# Patient Record
Sex: FEMALE
[De-identification: names, ages, dates, MRNs, and addresses within clinical notes are randomized per-mention and may not be internally consistent; named-entity substitution may affect disease eponyms.]

---

## 2023-04-17 PROBLEM — Z00.00 ENCOUNTER FOR PREVENTIVE HEALTH EXAMINATION: Status: ACTIVE | Noted: 2023-04-17

## 2023-05-17 ENCOUNTER — APPOINTMENT (OUTPATIENT)
Dept: ENDOCRINOLOGY | Facility: CLINIC | Age: 63
End: 2023-05-17

## 2023-05-18 ENCOUNTER — EMERGENCY (EMERGENCY)
Facility: HOSPITAL | Age: 63
LOS: 1 days | Discharge: HOME | End: 2023-05-18
Attending: STUDENT IN AN ORGANIZED HEALTH CARE EDUCATION/TRAINING PROGRAM
Payer: COMMERCIAL

## 2023-05-18 VITALS
OXYGEN SATURATION: 98 % | DIASTOLIC BLOOD PRESSURE: 74 MMHG | HEART RATE: 82 BPM | RESPIRATION RATE: 18 BRPM | SYSTOLIC BLOOD PRESSURE: 125 MMHG

## 2023-05-18 VITALS
DIASTOLIC BLOOD PRESSURE: 73 MMHG | RESPIRATION RATE: 19 BRPM | SYSTOLIC BLOOD PRESSURE: 135 MMHG | WEIGHT: 115.08 LBS | OXYGEN SATURATION: 99 % | TEMPERATURE: 98 F | HEART RATE: 87 BPM | HEIGHT: 63 IN

## 2023-05-18 LAB
ALBUMIN SERPL ELPH-MCNC: 3.6 G/DL — SIGNIFICANT CHANGE UP (ref 3.5–5)
ALP SERPL-CCNC: 36 U/L — LOW (ref 40–120)
ALT FLD-CCNC: 22 U/L DA — SIGNIFICANT CHANGE UP (ref 10–60)
ANION GAP SERPL CALC-SCNC: 9 MMOL/L — SIGNIFICANT CHANGE UP (ref 5–17)
APTT BLD: 25.7 SEC — LOW (ref 27.5–35.5)
AST SERPL-CCNC: 21 U/L — SIGNIFICANT CHANGE UP (ref 10–40)
BASOPHILS # BLD AUTO: 0.01 K/UL — SIGNIFICANT CHANGE UP (ref 0–0.2)
BASOPHILS NFR BLD AUTO: 0.1 % — SIGNIFICANT CHANGE UP (ref 0–2)
BILIRUB SERPL-MCNC: 0.9 MG/DL — SIGNIFICANT CHANGE UP (ref 0.2–1.2)
BUN SERPL-MCNC: 19 MG/DL — HIGH (ref 7–18)
CALCIUM SERPL-MCNC: 9.1 MG/DL — SIGNIFICANT CHANGE UP (ref 8.4–10.5)
CHLORIDE SERPL-SCNC: 105 MMOL/L — SIGNIFICANT CHANGE UP (ref 96–108)
CO2 SERPL-SCNC: 25 MMOL/L — SIGNIFICANT CHANGE UP (ref 22–31)
CREAT SERPL-MCNC: 1 MG/DL — SIGNIFICANT CHANGE UP (ref 0.5–1.3)
EGFR: 64 ML/MIN/1.73M2 — SIGNIFICANT CHANGE UP
EOSINOPHIL # BLD AUTO: 0.05 K/UL — SIGNIFICANT CHANGE UP (ref 0–0.5)
EOSINOPHIL NFR BLD AUTO: 0.4 % — SIGNIFICANT CHANGE UP (ref 0–6)
FLUAV AG NPH QL: SIGNIFICANT CHANGE UP
FLUBV AG NPH QL: SIGNIFICANT CHANGE UP
GLUCOSE SERPL-MCNC: 154 MG/DL — HIGH (ref 70–99)
HCT VFR BLD CALC: 44.8 % — SIGNIFICANT CHANGE UP (ref 34.5–45)
HGB BLD-MCNC: 15.2 G/DL — SIGNIFICANT CHANGE UP (ref 11.5–15.5)
IMM GRANULOCYTES NFR BLD AUTO: 0.4 % — SIGNIFICANT CHANGE UP (ref 0–0.9)
INR BLD: 1.08 RATIO — SIGNIFICANT CHANGE UP (ref 0.88–1.16)
LIDOCAIN IGE QN: 255 U/L — SIGNIFICANT CHANGE UP (ref 73–393)
LYMPHOCYTES # BLD AUTO: 0.58 K/UL — LOW (ref 1–3.3)
LYMPHOCYTES # BLD AUTO: 5.2 % — LOW (ref 13–44)
MCHC RBC-ENTMCNC: 29.3 PG — SIGNIFICANT CHANGE UP (ref 27–34)
MCHC RBC-ENTMCNC: 33.9 GM/DL — SIGNIFICANT CHANGE UP (ref 32–36)
MCV RBC AUTO: 86.5 FL — SIGNIFICANT CHANGE UP (ref 80–100)
MONOCYTES # BLD AUTO: 0.57 K/UL — SIGNIFICANT CHANGE UP (ref 0–0.9)
MONOCYTES NFR BLD AUTO: 5.1 % — SIGNIFICANT CHANGE UP (ref 2–14)
NEUTROPHILS # BLD AUTO: 9.94 K/UL — HIGH (ref 1.8–7.4)
NEUTROPHILS NFR BLD AUTO: 88.8 % — HIGH (ref 43–77)
NRBC # BLD: 0 /100 WBCS — SIGNIFICANT CHANGE UP (ref 0–0)
PLATELET # BLD AUTO: 168 K/UL — SIGNIFICANT CHANGE UP (ref 150–400)
POTASSIUM SERPL-MCNC: 3.6 MMOL/L — SIGNIFICANT CHANGE UP (ref 3.5–5.3)
POTASSIUM SERPL-SCNC: 3.6 MMOL/L — SIGNIFICANT CHANGE UP (ref 3.5–5.3)
PROT SERPL-MCNC: 6.7 G/DL — SIGNIFICANT CHANGE UP (ref 6–8.3)
PROTHROM AB SERPL-ACNC: 12.9 SEC — SIGNIFICANT CHANGE UP (ref 10.5–13.4)
RBC # BLD: 5.18 M/UL — SIGNIFICANT CHANGE UP (ref 3.8–5.2)
RBC # FLD: 12.1 % — SIGNIFICANT CHANGE UP (ref 10.3–14.5)
SARS-COV-2 RNA SPEC QL NAA+PROBE: SIGNIFICANT CHANGE UP
SODIUM SERPL-SCNC: 139 MMOL/L — SIGNIFICANT CHANGE UP (ref 135–145)
TROPONIN I, HIGH SENSITIVITY RESULT: 9.5 NG/L — SIGNIFICANT CHANGE UP
WBC # BLD: 11.2 K/UL — HIGH (ref 3.8–10.5)
WBC # FLD AUTO: 11.2 K/UL — HIGH (ref 3.8–10.5)

## 2023-05-18 PROCEDURE — 93005 ELECTROCARDIOGRAM TRACING: CPT

## 2023-05-18 PROCEDURE — 96374 THER/PROPH/DIAG INJ IV PUSH: CPT

## 2023-05-18 PROCEDURE — 85730 THROMBOPLASTIN TIME PARTIAL: CPT

## 2023-05-18 PROCEDURE — 84484 ASSAY OF TROPONIN QUANT: CPT

## 2023-05-18 PROCEDURE — 87637 SARSCOV2&INF A&B&RSV AMP PRB: CPT

## 2023-05-18 PROCEDURE — 83690 ASSAY OF LIPASE: CPT

## 2023-05-18 PROCEDURE — 96361 HYDRATE IV INFUSION ADD-ON: CPT

## 2023-05-18 PROCEDURE — 82962 GLUCOSE BLOOD TEST: CPT

## 2023-05-18 PROCEDURE — 99284 EMERGENCY DEPT VISIT MOD MDM: CPT | Mod: 25

## 2023-05-18 PROCEDURE — 99285 EMERGENCY DEPT VISIT HI MDM: CPT

## 2023-05-18 PROCEDURE — 93010 ELECTROCARDIOGRAM REPORT: CPT

## 2023-05-18 PROCEDURE — 36415 COLL VENOUS BLD VENIPUNCTURE: CPT

## 2023-05-18 PROCEDURE — 85025 COMPLETE CBC W/AUTO DIFF WBC: CPT

## 2023-05-18 PROCEDURE — 85610 PROTHROMBIN TIME: CPT

## 2023-05-18 PROCEDURE — 80053 COMPREHEN METABOLIC PANEL: CPT

## 2023-05-18 RX ORDER — SODIUM CHLORIDE 9 MG/ML
3 INJECTION INTRAMUSCULAR; INTRAVENOUS; SUBCUTANEOUS EVERY 8 HOURS
Refills: 0 | Status: DISCONTINUED | OUTPATIENT
Start: 2023-05-18 | End: 2023-05-21

## 2023-05-18 RX ORDER — SODIUM CHLORIDE 9 MG/ML
1000 INJECTION INTRAMUSCULAR; INTRAVENOUS; SUBCUTANEOUS ONCE
Refills: 0 | Status: COMPLETED | OUTPATIENT
Start: 2023-05-18 | End: 2023-05-18

## 2023-05-18 RX ORDER — ONDANSETRON 8 MG/1
4 TABLET, FILM COATED ORAL ONCE
Refills: 0 | Status: COMPLETED | OUTPATIENT
Start: 2023-05-18 | End: 2023-05-18

## 2023-05-18 RX ADMIN — SODIUM CHLORIDE 1000 MILLILITER(S): 9 INJECTION INTRAMUSCULAR; INTRAVENOUS; SUBCUTANEOUS at 04:21

## 2023-05-18 RX ADMIN — SODIUM CHLORIDE 1000 MILLILITER(S): 9 INJECTION INTRAMUSCULAR; INTRAVENOUS; SUBCUTANEOUS at 05:10

## 2023-05-18 RX ADMIN — ONDANSETRON 4 MILLIGRAM(S): 8 TABLET, FILM COATED ORAL at 04:21

## 2023-05-18 NOTE — ED PROVIDER NOTE - NSFOLLOWUPINSTRUCTIONS_ED_ALL_ED_FT
You were seen in the emergency room today. Please call your primary doctor to inform them of this ER visit and obtain the next available appointment within the next 5 days. As we discussed, return to the ER if you have any worsening symptoms.    We no longer feel that you need further emergency care or admission to the hospital at this time.    While we have determined that you are currently stable for discharge, we know that things can change. Please seek immediate medical attention or return to the ER if you experience any of the following:  Any worsening or persistent symptoms  Severe Pain  Chest Pain  Difficulty Breathing  Bleeding  Passing Out  Severe Rash  Inability to Eat or Drink  Persistent Fever    Please see a primary care doctor or specialist within 5 days to ensure that you are improving.    Please call the [facility] phone numbers on this document if you have any problems obtaining a follow up appointment.    I wish you well! -Dr Hood

## 2023-05-18 NOTE — ED PROVIDER NOTE - OBJECTIVE STATEMENT
62-year-old female with no significant past medical history, Xanax for sleep is her home only home medication, presenting with several hours of vomiting and diarrhea with syncope.  Patient states that yesterday morning she woke up feeling well and then over the day started feeling some fatigue and generalized weakness.  Patient states that she then went to bed and woke up approximately 4 hours ago with nausea vomiting and watery diarrhea. Pt denies any ABD pain with these symptoms. Patient states while sitting on the toilet she briefly passed out with no obvious pain/injuries from the fall. Pt denies associated chest pain, SOB, severe headache or neck stiffness, diaphoresis, focal numbness or weakness, fever, urinary symptoms. No h/o heart problems/ clots or clot RFs/ seizures. No other recent illness or hospitalizations.

## 2023-05-18 NOTE — ED PROVIDER NOTE - CLINICAL SUMMARY MEDICAL DECISION MAKING FREE TEXT BOX
Pt p/w several hours of vomiting and diarrhea w/o ABD pain and a syncopal episode and a reassuring exam. EKG wnl. Labs pending. Pt stable. Will reassess. Pt p/w several hours of vomiting and diarrhea w/o ABD pain and a syncopal episode and a reassuring exam. EKG wnl. Labs pending. Pt stable. Will reassess.    Labs showing mild WBC elevation and otw unremarkable. On reassessment pt is now feeling better and tolerating PO w/o issue. Rec PMD f/u ASAP. Most likely mild gastroenteritis vs other non emergent etiology of symptoms- the details of the case, history, and exam make more emergent diagnoses much less likely. Discussed with pt my clinical impression and results, patient given strict return precautions if persistent or worsening of symptoms occurs, and need for close follow up. Pt expressed understanding and agrees with plan. Pt is well appearing with a reassuring exam. Discharge home with PMD or Specialist f/u within 5 days.

## 2023-05-18 NOTE — ED PROVIDER NOTE - PATIENT PORTAL LINK FT
You can access the FollowMyHealth Patient Portal offered by [facility] by registering at the following website: [email or web address]. By joining [employer]’s FollowMyHealth portal, you will also be able to view your health information using other applications (apps) compatible with our system.

## 2023-05-18 NOTE — ED PROVIDER NOTE - PHYSICAL EXAMINATION
Vital Signs Reviewed  GEN: Comfortable, Conversant in full sentences with clear speech, NAD, AAOx3  HEENT: NCAT, MMM, Neck Supple  RESP: CTAB, No rales/rhonchi/wheezing  CV: RRR, S1S2, No murmurs  ABD: No TTP, Soft, ND, No masses, No CVA Tenderness  Extrem/Skin: Equal pulses bilat, No cyanosis/edema/rashes  Neuro: No focal deficits

## 2023-05-18 NOTE — ED ADULT NURSE NOTE - NSFALLUNIVINTERV_ED_ALL_ED
Bed/Stretcher in lowest position, wheels locked, appropriate side rails in place/Call bell, personal items and telephone in reach/Instruct patient to call for assistance before getting out of bed/chair/stretcher/Non-slip footwear applied when patient is off stretcher/[place] to call system/Physically safe environment - no spills, clutter or unnecessary equipment/Purposeful proactive rounding/Room/bathroom lighting operational, light cord in reach

## 2023-05-18 NOTE — ED ADULT TRIAGE NOTE - AS TEMP SITE
Chronic Pain - Established Visit    Referring Physician: No ref. provider found    Chief Complaint:   Chief Complaint   Patient presents with    Low-back Pain    Leg Pain        SUBJECTIVE: Disclaimer: This note has been generated using voice-recognition software. There may be typographical errors that have been missed during proof-reading    Interval History 6/7/2022:  The patient returns to clinic today for follow up of low back pain. He reports increased low back pain over the last two weeks. He reports low back pain that radiates into the posterior aspect of his right leg to the bottom of his foot. He denies any left leg pain. His pain is worse with prolonged standing and walking. He reports that previous TF JEAN MARIE in 2021 provided 80% relief. He did notice that it took 3-4 weeks to see full benefit. He continues to take Gabapentin. He continues to perform a home exercise routine as prescribed. He denies any weakness. He denies any other health changes. His pain today is 5/10.    Interval history 07/02/2021:  The patient presents for follow-up lower back pain and right leg radiculopathy.  He is status post repeat right-sided TF JEAN MARIE to L5/S1&S1.  He has difficulty quantifying pain relief but states that normally his leg pain is 100% resolved and this time pain persists.  He is not have any recent images.  His medication regimen includes gabapentin 300 mg states this is mildly beneficial or denies any adverse side effects of medication.  Denies any focal loss of bowel, bladder or saddle paresthesias concerning for cauda equina.    Interval history 05/27/2021:  The patient presents for follow-up of lower back pain and bilateral leg radiculopathy.  He has had prior ILESI and TFESI both with 80%  benefit lasting approximately 9 months and pain just now returning. Pain is worse to right side at this time.  He denies any new areas of pain or neurological changes.The patient denies myelopathic symptoms such as  handwriting changes or difficulty with buttons/coins/keys. Denies perineal paresthesias, bowel/bladder dysfunction.    Interval History 9/1/2020:  The patient is here for follow up of back and leg pain.  He is now s/p L5/S1 IL JEAN MARIE with 80% of back pain.  However, he is having significant right leg pain, mainly down the buttock and posterior calf.  He had benefit with right L5/S1 and S1 TF JEAN MARIE in the past and would like to repeat.  He has associated numbness to right calf, worse with walking.  He does have some benefit with Gabapentin.  He had a recent Covid test for work which was negative. His pain today is 5/10.    Interval History 6/9/2020:  The patient is here for follow up of chronic lower back pain.  We have not seen the patient since last year.  He underwent an epidural at that time and says that his pain has been very mild until a few weeks ago.  He is having pain across lower back with radiation into the legs, mainly on the left.  He continues to take gabapentin with some benefit.  The pain bothers him the most with walking and activity.  He had pacemaker replacement on 5/6/20.  He is not currently on blood thinners.  His pain today is 5/10.    Interval History 7/30/2019:  The patient returns for follow up of back pain.  He is s/p repeat left then right L3,4,5 RFAs completed on 6/24/19 with about 80% relief.  His back pain is mild.  He has had increased leg pain recently, worse on the left side.  Previous leg pain was relieved with JEAN MARIE last year.  He would like to repeat this.  His leg pain bothers him mainly at night when trying to sleep.  He stopped Gabapentin when he was not having leg pain but recently restarted.  He is unable to take at night.  His pain today is 6/10.    Interval History 5/14/2019:  The patient returns for follow up.  He previously had benefit with JEAN MARIE for leg pain and RFAs for back pain.  He is having some back pain that has been worsening over the past couple of weeks.  He has  significant pain in the morning.  He has some improvement when he moves around.  He says that cold air aggravates his pain.  He stopped Gabapentin last year when his leg pain improved.  He is not having much leg pain now.  His pain today is 6/10.    Interval History 2/7/2019:  The patient presents for check up of chronic back pain.  He reports doing well since last visit.  He feels that last JEAN MARIE is still providing him benefit.  He has not had any leg pain.  He does still have back pain but states that it is tolerable.  His morning pain is much improved from RFAs last year.  He continues to work and is active.  His pain today is 5/10.    Interval History 12/6/2018:  The patient presents today for follow up.  He continues with pain to the lower back.  He is not having leg pain at this time.  He had some relief with RFAs with the past.  He stopped Gabapentin when his leg pain improved.  He takes Tylenol sparingly with some benefit.  His pain today is 5/10.  The patient denies any bowel or bladder incontinence or signs of saddle paresthesia.  The patient denies any major medical changes since last office visit.    Interval History 10/25/2018:  The patient returns for follow up of back and leg pain.  He is s/p L5/S1 IL JEAN MARIE on 10/11/18 with 80% pain relief for his legs.  He has had increased lower back pain over the past few days which he attributes to weather changes.  He describes it as aching.  He has not been stretching.  He does walk a lot for work.  He cannot take oral NSAIDs due to pacemaker placement.  His pain today is 8/10.     Interval History 9/11/2018:  The patient presents for procedure follow up appointment.  He is s/p left then right L3,4,5 RFA completed on 8/14/18 with 80% pain relief initially.  He has had a little more pain over the past week.  He is now having intermittent radiation into the back of both legs, left greater than right.  He previously had benefit with right sided TF JEAN MARIE.  He is still  taking gabapentin.  His pain today is 5/10.    Interval History 7/25/2018:  The patient returns today for follow up of back pain.  He is s/p bilateral L3,4,5 MBB on 7/5/18 with 100% relief for 2 days.  He previously had benefit with right TF JEAN MARIE for leg pain.  He has not had right leg pain since the epidural.  However, he is reporting lateral left leg pain that has progressed over the past couple of weeks.  He continues to be active and works.  His pain today is 5/10.    Interval History 6/20/2018:  The patient returns today for follow up of lower back and right leg pain.  He is s/p right L5 and S1 TF JEAN MARIE on 6/5/18 with 100% relief of right leg pain.  He has not had any leg pain since the procedure.  He continues to report pain across the lower back.  This is always worse first thing in the morning.  He states that this feels aching and throbbing in nature.  He did have recent lumbar CT scan.  He would like to discuss further procedures.  He continues to work and be active.  His pain today is 5/10.    Interval History 5/22/2018:  The patient returns for follow up of back pain.  He is having radiation down there back of the right leg to the posterior calf.  The back pain is most severe in the morning and he states that this feels like a stiffness.  This improves when he walks.  He has severe leg pain that is intermittent, mainly with activity.  This is his biggest complaint.  He did complete the medrol dose pack recently with limited improvement.  He had XRAYs which show severe loss of disc space at L5/S1.  He has not had a CT scan.  He is taking Gabapentin 300 mg at bedtime.  It is written BID but it makes him drowsy during the day.  He has some benefit with Aleve but has been told to avoid NSAIDs due to history of pacemaker placement.  His pain today is 5/10.     Initial encounter:    Renny TOÑO Young presents to the clinic for the evaluation of lower back and right leg pain. The pain started two months ago and  symptoms have been worsening.    Brief history:    Pain Description:    The pain is located in the lower back and right leg area in the L5/S1 distribution.      At BEST  5/10     At WORST  7/10 on the WORST day.      On average pain is rated as 5/10.     Today the pain is rated as 5/10    The pain is described as aching      Symptoms interfere with daily activity.     Exacerbating factors: Standing and Morning.      Mitigating factors medications.     Patient denies night fever/night sweats, urinary incontinence, bowel incontinence, significant weight loss, significant motor weakness and loss of sensations.  Patient denies any suicidal or homicidal ideations    Pain Medications:  Current:  OTC Tylenol PRN    Tried in Past:  NSAIDs - Aleve  TCA -Never  SNRI -Never  Anti-convulsants - Gabapentin   Muscle Relaxants -Never  Opioids-Never    Physical Therapy/Home Exercise: yes     report:  Reviewed and consistent with medication use as prescribed.    Pain Procedures:   6/5/18 Right L5 and S1 TF JEAN MARIE- significant relief  7/5/18 Bilateral L3,4,5 MBB- 100% relief for 2 days  7/31/18 Left L3,4,5 RFA- 80% relief  8/14/18 Right L3,4,5 RFA- 80% relief  10/11/18 L5/S1 IL JAEN MARIE- 80% relief  6/11/19 Left L3,4,5 RFA- 80% relief  6/25/19 Right L3,4,5 RFA- 80% relief  8/13/19 L5/S1 IL JEAN MARIE- 90% relief for 9 months  6/16/20 L5/S1 IL JEAN MARIE- 80% relief of back pain  6/15/2021- Right L5/S1 and S1 TF JEAN MARIE    Chiropractor -never  Acupuncture - never  TENS unit -never  Spinal decompression -never  Joint replacement -never    Imaging:   CT Lumbar Spine 7/9/2021:  COMPARISON:  Lumbar spine CT May 28, 2018     FINDINGS:  Mild levocurvature of the lumbar spine.  No listhesis.  There is no acute fracture.  The vertebral bodies are normal in height without compression fractures. Posterior elements are intact. There is unchanged severe disc height loss at the L5-S1 level with associated sclerosis and subchondral cystic change within the endplates.   Mild atherosclerotic calcification within the abdominal aorta which is not aneurysmal.  Otherwise, no soft tissue abnormality identified.     T12-L1: The disc is normal in configuration.  There is no facet arthropathy.  There is no neural foraminal stenosis.  There is no spinal canal stenosis.     L1-L2: The disc is normal in configuration.  There is no facet arthropathy.  There is no neuroforaminal stenosis.  There is no spinal canal stenosis.     L2-L3: The disc is normal in configuration.  There is no facet arthropathy.  There is no neuroforaminal stenosis.  There is no spinal canal stenosis.     L3-L4: Minimal diffuse disc bulge.  Mild bilateral facet arthropathy.  There is no neuroforaminal stenosis.  There is no spinal canal stenosis.     L4-L5: There is a diffuse disc bulge.  Mild-to-moderate bilateral facet arthropathy.  There is no neuroforaminal stenosis.  There is no spinal canal stenosis.     L5-S1: There is a circumferential disc osteophyte complex secondary to the severe degenerative disc disease at this level.  Mild to moderate bilateral facet arthropathy.  Unchanged mild bilateral neural foraminal stenosis secondary to diffuse disc osteophyte complex as well as the facet arthropathy.  There is no spinal canal stenosis.     Impression:     Inferior lumbar spine degenerative changes worst at L5-S1 which result in mild bilateral neural foraminal stenosis at this level.  Overall, findings are not significantly changed compared to prior study from May 2018.        Past Medical History:   Diagnosis Date    Colon polyp     Diabetes mellitus, type 2     Hypertension     Pacemaker      Past Surgical History:   Procedure Laterality Date    COLONOSCOPY N/A 2/13/2017    Procedure: COLONOSCOPY;  Surgeon: NILDA Juares MD;  Location: [facility] ([address] [place] [address]);  Service: Endoscopy;  Laterality: N/A;  Do not cancel this order. Patient has Pacemaker in place.     EPIDURAL STEROID INJECTION N/A 8/13/2019     Procedure: INJECTION, STEROID, EPIDURAL IL, L5/S1;  Surgeon: Josue Paredes MD;  Location: [facility] PAIN MGT;  Service: Pain Management;  Laterality: N/A;  IL JEAN MARIE L5/S1    EPIDURAL STEROID INJECTION N/A 6/16/2020    Procedure: INJECTION, STEROID, EPIDURAL, L5-S1 IL add on @ 2 ok by  Asia;  Surgeon: Josue Paredes MD;  Location: [facility] PAIN [facility]GT;  Service: Pain Management;  Laterality: N/A;    HERNIA REPAIR      INJECTION OF ANESTHETIC AGENT AROUND NERVE Bilateral 7/5/2018    Procedure: BLOCK, NERVE;  Surgeon: Krystal Mtz MD;  Location: [facility] PAIN [facility];  Service: Pain Management;  Laterality: Bilateral;  Lumbar Bilateral L3-L4-L5 Medial Branch Block    RADIOFREQUENCY ABLATION Left 6/11/2019    Procedure: RADIOFREQUENCY ABLATION, L3-L4-L5 MEDIAL BRANCH   1 OF 2;  Surgeon: Josue Paredes MD;  Location: [facility]IN [facility];  Service: Pain Management;  Laterality: Left;    RADIOFREQUENCY ABLATION Right 6/25/2019    Procedure: RADIOFREQUENCY ABLATION, L3-L4-L5 MEDIAL BRANCH JING 2 OF 2;  Surgeon: Josue Paredes MD;  Location: [facility];  Service: Pain Management;  Laterality: Right;    REPLACEMENT OF PACEMAKER GENERATOR Left 5/6/2020    Procedure: REPLACEMENT, PULSE GENERATOR, CARDIAC PACEMAKER;  Surgeon: Bradford Spence MD;  Location: [facility] EP LAB;  Service: Cardiology;  Laterality: Left;  EOL/LEAD Mlfx, Gen Change, Poss RA lead rev, SJM, MAC, GP, 3 PREP    REVISION OF PROCEDURE INVOLVING PACEMAKER LEAD Left 5/6/2020    Procedure: REVISION, ELECTRODE LEAD, CARDIAC PACEMAKER;  Surgeon: Bradford Spence MD;  Location: [facility] EP LAB;  Service: Cardiology;  Laterality: Left;    TRANSFORAMINAL EPIDURAL INJECTION OF STEROID Right 6/5/2018    Procedure: INJECTION-STEROID-EPIDURAL-TRANSFORAMINAL;  Surgeon: Josue Paredes MD;  Location: [facility] PAIN [facility]GT;  Service: Pain Management;  Laterality: Right;  LUMBAR RIGHT L5 AND S1 TRANSFORAMINL JEAN MARIE  [id number]    W/ SEDATION     TRANSFORAMINAL EPIDURAL INJECTION OF STEROID  Right 9/10/2020    Procedure: INJECTION, STEROID, EPIDURAL, TRANSFORAMINAL APPROACH, L5-S1 AND S1;  Surgeon: Josue Paredes MD;  Location: [facility] PAIN MGT;  Service: Pain Management;  Laterality: Right;    TRANSFORAMINAL EPIDURAL INJECTION OF STEROID Right 6/15/2021    Procedure: INJECTION, STEROID, EPIDURAL, TRANSFORAMINAL APPROACH L5-S1 AND S1 need consent;  Surgeon: Josue Paredes MD;  Location: [facility] PAIN [facility]GT;  Service: Pain Management;  Laterality: Right;     Social History     Socioeconomic History    Marital status:     Number of children: 3   Occupational History    Occupation:    Tobacco Use    Smoking status: Current Every Day Smoker     Packs/day: 0.50     Types: Cigarettes    Smokeless tobacco: Never Used   Substance and Sexual Activity    Alcohol use: Yes     Comment: Beer- Socially    Drug use: No    Sexual activity: Yes     Family History   Problem Relation Age of Onset    Diabetes Mother     Diabetes Father     Kidney disease Father     Melanoma Neg Hx        Review of patient's allergies indicates:  No Known Allergies    Current Outpatient Medications   Medication Sig    amLODIPine (NORVASC) 5 MG tablet Take 1 tablet (5 mg total) by mouth once daily.    aspirin (ECOTRIN) 81 MG EC tablet Take 81 mg by mouth once daily.    atorvastatin (LIPITOR) 20 MG tablet Take 1 tablet (20 mg total) by mouth once daily.    gabapentin (NEURONTIN) 300 MG capsule Take 1 capsule by mouth twice daily    hydroCHLOROthiazide (HYDRODIURIL) 12.5 MG Tab Take 1 tablet (12.5 mg total) by mouth once daily.    losartan (COZAAR) 50 MG tablet Take 1 tablet (50 mg total) by mouth once daily.    metFORMIN (GLUCOPHAGE) 500 MG tablet Take 1 tablet (500 mg total) by mouth daily with breakfast.    podofilox (CONDYLOX) 0.5 % gel Apply topically 2 (two) times daily.     No current facility-administered medications for this visit.       REVIEW OF SYSTEMS:    GENERAL:  No weight loss, malaise or  "fevers.  HEENT:   No recent changes in vision or hearing  NECK:  Negative for lumps, no difficulty with swallowing.  RESPIRATORY:  Negative for cough, wheezing or shortness of breath, patient denies any recent URI.  CARDIOVASCULAR:  Negative for chest pain, leg swelling or palpitations. Pacemaker for SSS.  GI:  Negative for abdominal discomfort, blood in stools or black stools or change in bowel habits.  MUSCULOSKELETAL:  See HPI.  SKIN:  Negative for lesions, rash, and itching.  PSYCH:  No mood disorder or recent psychosocial stressors.  Patients sleep is not disturbed secondary to pain.  HEMATOLOGY/LYMPHOLOGY:  Negative for prolonged bleeding, bruising easily or swollen nodes.  Patient is not currently taking any anti-coagulants  ENDO: DM2 on metformin  NEURO:   No history of headaches, syncope, paralysis, seizures or tremors.  All other reviewed and negative other than HPI.    OBJECTIVE:    BP (!) 150/86 (BP Location: Left arm, Patient Position: Sitting, BP Method: Small (Automatic))   Pulse 63   Resp 18   Ht 5' 7" (1.702 m)   Wt 73.8 kg (162 lb 11.2 oz)   BMI 25.48 kg/m²     PHYSICAL EXAMINATION:    GENERAL: Well appearing, in no acute distress, alert and oriented x3.  PSYCH:  Mood and affect appropriate.  SKIN: Skin color, texture, turgor normal, no rashes or lesions.  HEAD/FACE:  Normocephalic, atraumatic. Cranial nerves grossly intact.  CV: RRR with palpation of the radial artery.  PULM: No evidence of respiratory difficulty, symmetric chest rise.  BACK: Straight leg raising in the sitting position is positive to radicular pain on the right L5 and S1 distribution, negative on the left. There is mild pain with palpation over the facet joints of the lumbar spine bilaterally. Limited ROM with pain on flexion and extension. Positive facet loading bilaterally.   EXTREMITIES: No deformities, edema, or skin discoloration. Good capillary refill.  MUSCULOSKELETAL:There is no pain with palpation over the " sacroiliac joints bilaterally. There is no pain to palpation over the greater trochanteric bursa bilaterally. FABERs test is negative.  FADIRs test is negative.  5/5 strength in right ankle with plantar and dorsiflexion, 5/5 strength in left ankle with plantar and dorsiflexion, 5/5 strength with right knee flexion extension, 5/5 strength with knee flexion extension on the left.  No atrophy or tone abnormalities are noted.  NEURO: Bilateral lower extremity coordination and muscle stretch reflexes are physiologic and symmetric.  Plantar response are downgoing. No clonus.  Decreased sensation to RLE at L5 distribution.  GAIT: Antalgic- ambulates without assistance.      ASSESSMENT: 62 y.o. year old male with lower back pain, consistent with the following diagnoses:    Encounter Diagnoses   Name Primary?    Lumbosacral radiculopathy Yes    Lumbar radiculopathy     Lumbar spondylosis     DDD (degenerative disc disease), lumbosacral     DDD (degenerative disc disease), lumbar        PLAN:     - We discussed that Dr. Paredes has left [facility]. I will have him establish care with Dr. Mtz.     - Previous imaging was reviewed and discussed with the patient today.    - Schedule for right L5/S1 and S1 TF JEAN MARIE. Previous TF JEAN MARIE provided 80% relief.     - Continue Gabapentin.    - The patient will continue a home exercise routine to help with pain and strengthening.    - RTC 2 weeks after above procedure.       The above plan and management options were discussed at length with patient. Patient is in agreement with the above and verbalized understanding.     Krissy Garcia  06/07/2022     oral

## 2024-05-17 ENCOUNTER — APPOINTMENT (OUTPATIENT)
Dept: ENDOCRINOLOGY | Facility: CLINIC | Age: 64
End: 2024-05-17